# Patient Record
Sex: MALE | Race: WHITE
[De-identification: names, ages, dates, MRNs, and addresses within clinical notes are randomized per-mention and may not be internally consistent; named-entity substitution may affect disease eponyms.]

---

## 2021-10-15 ENCOUNTER — HOSPITAL ENCOUNTER (OUTPATIENT)
Dept: HOSPITAL 47 - RADUSWWP | Age: 71
Discharge: HOME | End: 2021-10-15
Attending: FAMILY MEDICINE
Payer: MEDICARE

## 2021-10-15 DIAGNOSIS — M77.32: Primary | ICD-10-CM

## 2021-10-15 DIAGNOSIS — M12.872: ICD-10-CM

## 2021-10-15 NOTE — XR
EXAMINATION TYPE: XR tibia fibula LT

 

DATE OF EXAM: 10/15/2021

 

COMPARISON: NONE

 

HISTORY: Pain

 

TECHNIQUE:  Two views are submitted.

 

FINDINGS:

The osseous structures are intact. Diffuse osteopenia. Narrowing of the knee joint and hypertrophic c
hanges involving the medial and lateral malleolus. Large calcaneal spurs and calcification along the 
plantar a bone arthrosis..  

 

IMPRESSION: 

1. No acute osseous abnormality.

2. Arthropathy. Large plantar calcaneal spurs.

## 2021-10-15 NOTE — XR
EXAMINATION TYPE: XR toes LT

 

DATE OF EXAM: 10/15/2021

 

COMPARISON: NONE

 

HISTORY: Pain

 

TECHNIQUE: Three views are submitted.

 

FINDINGS:

The osseous structures are intact.    There is no acute fracture or dislocation. Severe hypertrophic 
arthropathy first MTP. Arthropathy of all DIP joints.

 

IMPRESSION:

1. No acute fracture or dislocation.  If symptoms persist, follow-up exam in 7 to 10 days could be ob
tained.  

2. Severe arthropathy.

## 2021-10-15 NOTE — US
EXAMINATION TYPE: US venous doppler duplex LE 

 

DATE OF EXAM: 10/15/2021 11:31 AM

 

COMPARISON: NONE

 

CLINICAL HISTORY: R60.0 LOCALIZED EDEMA. Patient on [employer].

 

SIDE PERFORMED: Left  

 

TECHNIQUE:  The lower extremity deep venous system is examined utilizing real time linear array sonog
cornel with graded compression, doppler sonography and color-flow sonography.

 

VESSELS IMAGED:

Common Femoral Vein

Deep Femoral Vein

Greater Saphenous Vein *

Femoral Vein

Popliteal Vein

Small Saphenous Vein *

Proximal Calf Veins

(* superficial vessels)

 

 

 

 

 

Left Leg:  Negative for DVT

 

Scanning was performed directly over lump left lower leg. There is a complex fluid collection measuri
ng 5.4 x 1.2 x 4.5 cm. 

 

Grayscale, color doppler, spectral doppler imaging performed of the deep veins of the left lower extr
emity.  There is normal flow, compressibility, vascular waveforms.

 

 

IMPRESSION:  No ultrasound evidence for acute DVT in the left lower extremity. Towards end of study t
here is mild to moderate diffuse subcutaneous edema and a moderate size nonsimple fluid collection in
 the lower leg at the palpable lump. Subcutaneous Hematoma suspected in this patient on blood thinner
s. Correlate clinically.

## 2021-10-27 ENCOUNTER — HOSPITAL ENCOUNTER (OUTPATIENT)
Dept: HOSPITAL 47 - OR | Age: 71
Discharge: HOME | End: 2021-10-27
Payer: MEDICARE

## 2021-10-27 VITALS — TEMPERATURE: 97.8 F | RESPIRATION RATE: 16 BRPM

## 2021-10-27 VITALS — HEART RATE: 61 BPM | SYSTOLIC BLOOD PRESSURE: 124 MMHG | DIASTOLIC BLOOD PRESSURE: 74 MMHG

## 2021-10-27 VITALS — BODY MASS INDEX: 34.3 KG/M2

## 2021-10-27 DIAGNOSIS — H35.3112: ICD-10-CM

## 2021-10-27 DIAGNOSIS — H25.12: Primary | ICD-10-CM

## 2021-10-27 DIAGNOSIS — Z79.82: ICD-10-CM

## 2021-10-27 DIAGNOSIS — E78.5: ICD-10-CM

## 2021-10-27 DIAGNOSIS — Z79.84: ICD-10-CM

## 2021-10-27 DIAGNOSIS — H40.1111: ICD-10-CM

## 2021-10-27 DIAGNOSIS — H35.3122: ICD-10-CM

## 2021-10-27 DIAGNOSIS — L71.9: ICD-10-CM

## 2021-10-27 DIAGNOSIS — H40.1122: ICD-10-CM

## 2021-10-27 DIAGNOSIS — Z79.899: ICD-10-CM

## 2021-10-27 DIAGNOSIS — Z86.711: ICD-10-CM

## 2021-10-27 DIAGNOSIS — E11.9: ICD-10-CM

## 2021-10-27 DIAGNOSIS — Z86.718: ICD-10-CM

## 2021-10-27 DIAGNOSIS — I10: ICD-10-CM

## 2021-10-27 DIAGNOSIS — Z79.01: ICD-10-CM

## 2021-10-27 DIAGNOSIS — H35.30: ICD-10-CM

## 2021-10-27 DIAGNOSIS — M54.9: ICD-10-CM

## 2021-10-27 DIAGNOSIS — H40.1131: ICD-10-CM

## 2021-10-27 DIAGNOSIS — Z85.828: ICD-10-CM

## 2021-10-27 PROCEDURE — 66990 OPHTHALMIC ENDOSCOPE ADD-ON: CPT

## 2021-10-27 PROCEDURE — 65820 GONIOTOMY: CPT

## 2021-10-27 PROCEDURE — 66984 XCAPSL CTRC RMVL W/O ECP: CPT

## 2021-10-27 RX ADMIN — MOXIFLOXACIN PRN DROPS: 5 SOLUTION/ DROPS OPHTHALMIC at 12:41

## 2021-10-27 RX ADMIN — CYCLOPENTOLATE HYDROCHLORIDE PRN DROPS: 10 SOLUTION/ DROPS OPHTHALMIC at 10:55

## 2021-10-27 RX ADMIN — PHENYLEPHRINE HYDROCHLORIDE PRN DROPS: 25 SOLUTION/ DROPS OPHTHALMIC at 10:40

## 2021-10-27 RX ADMIN — PHENYLEPHRINE HYDROCHLORIDE PRN DROPS: 25 SOLUTION/ DROPS OPHTHALMIC at 10:50

## 2021-10-27 RX ADMIN — TIMOLOL MALEATE PRN DROPS: 5 SOLUTION OPHTHALMIC at 12:41

## 2021-10-27 RX ADMIN — CYCLOPENTOLATE HYDROCHLORIDE PRN DROPS: 10 SOLUTION/ DROPS OPHTHALMIC at 10:45

## 2021-10-27 RX ADMIN — CYCLOPENTOLATE HYDROCHLORIDE PRN DROPS: 10 SOLUTION/ DROPS OPHTHALMIC at 10:35

## 2021-10-27 RX ADMIN — MOXIFLOXACIN PRN DROPS: 5 SOLUTION/ DROPS OPHTHALMIC at 12:35

## 2021-10-27 RX ADMIN — TIMOLOL MALEATE PRN DROPS: 5 SOLUTION OPHTHALMIC at 12:34

## 2021-10-27 RX ADMIN — PHENYLEPHRINE HYDROCHLORIDE PRN DROPS: 25 SOLUTION/ DROPS OPHTHALMIC at 10:30

## 2021-10-27 NOTE — P.OP
Date of Procedure: 10/27/21


Preoperative Diagnosis: 


NS & POAG mild


Postoperative Diagnosis: 


same


Procedure(s) Performed: 


PIOL & goniotomy OS


Implants: 


GTU826 24.50


Anesthesia: MAC


Surgeon: Werner Davies


Pathology: none sent


Condition: stable


Disposition: same day


Indications for Procedure: 


blurry vision and glaucoma control


Operative Findings: 


no complications

## 2021-10-28 NOTE — OP
OPERATIVE REPORT



DATE OF SURGERY:

10/27/2021.



PROCEDURE:

Phacoemulsification of cataract and intraocular lens implant of the left eye with

goniotomy of the left eye.



PREOPERATIVE DIAGNOSES:

Nuclear sclerosis and open-angle glaucoma, mild stage.



POSTOPERATIVE DIAGNOSIS:

Nuclear sclerosis and open-angle glaucoma, mild stage.



SURGEON:

Dr. Werner Davies.



ANESTHESIA:

Topical.



ESTIMATED BLOOD LOSS:

None.



SPECIMEN TAKEN:

None.



NARRATIVE:

After obtaining the appropriate consent, the patient was brought to the operating room.

There he was asked to sit upright, and the axes of zero and 180 degrees on the

patient's corneal limbus were identified and marked with a gentian violet marker.  He

was then placed in the proper supine position under cardiac monitoring, then prepped

and draped in the usual sterile manner.  He was approached from his left temporal side,

and using previously acquired corneal topography information, a [employer] axis marker

previously inked in gentian violet was placed on the patient's cornea to radha the axis

of 177 degrees.  This was followed by a paracentesis at the 5 o'clock position.  1%

xylocaine MPF 50:50 mix with balanced salt solution was then injected into the anterior

chamber.  This was followed by staining of the anterior structures with Trypan blue,

which was irrigated away after one minute Visco was then used to stabilize the anterior

chamber, and at the 3 o'clock position a 2.5 mm keratome was used to create a self-

sealing corneal flap incision.  The patient was then asked to rotate his head

approximately 45 degrees to his right, and a gonioscopy prism was placed on the

patient's cornea.  Using a [place] Dual Blade goniotomy knife which had been advanced

across the anterior chamber into the nasal portion of the patient's eye using a radha-

and-meet method, approximately 5-1/2 clock-hours of nasal trabecular meshwork was

removed without difficulty.  The patient was then brought to the normal supine position

and a cystotome was introduced to begin a continuous tear capsulorrhexis which was then

otherwise completed using Utrata forceps.  Hydrodissection and hydrodelineation of the

lens was accomplished with balanced salt solution.  Phacoemulsification of the lens

utilizing phaco chop was accomplished in 13.98 seconds at 16% power.  Additional

Xylocaine MPF was instilled into the anterior chamber.  This was followed by removal of

the remaining cortex from in and around the intraocular lens bag as well as some

careful polishing of the posterior capsule in the capsule vacuum mode. Using

additionally Zac and Pepose anterior capsule polishers which were applied to the

anterior capsule, this removed any remaining lens material that could be found. The

capsular bag was then stabilized using Provisc, and a Maco and Maco model YNR608

24.5-diopter spherical equivalent intraocular lens was then introduced into the

capsular bag without difficulty.  The remaining viscoelastic was removed from in and

around the intraocular lens, and using previously marked corneal hashes from the [employer]

marker, the index marks on the patient's lens were then aligned with this 177-degree

axis.  The lens was lightly tamponaded against the posterior capsule to ensure

rotational stability. Following removal of all remaining viscoelastic from the anterior

chamber, the eye was brought to slightly higher than normal intraocular pressure

following the goniotomy procedure which had been previously accomplished.  The

incisions were confirmed watertight.  He then received two drops of 0.5% timolol

followed by two drops of 0.5% moxifloxacin.  He was then lightly patched and shielded

in the usual manner.  There were no complications from the procedure.  He tolerated the

procedure well and was [facility] in good condition.





MMODL / IJN: [id number] / Job#: [id number]

## 2022-02-16 ENCOUNTER — HOSPITAL ENCOUNTER (OUTPATIENT)
Dept: HOSPITAL 47 - OR | Age: 72
Discharge: HOME | End: 2022-02-16
Payer: MEDICARE

## 2022-02-16 VITALS — HEART RATE: 66 BPM | DIASTOLIC BLOOD PRESSURE: 86 MMHG | SYSTOLIC BLOOD PRESSURE: 151 MMHG

## 2022-02-16 VITALS — TEMPERATURE: 98.3 F

## 2022-02-16 VITALS — BODY MASS INDEX: 33.3 KG/M2

## 2022-02-16 VITALS — RESPIRATION RATE: 16 BRPM

## 2022-02-16 DIAGNOSIS — Z79.899: ICD-10-CM

## 2022-02-16 DIAGNOSIS — H52.6: ICD-10-CM

## 2022-02-16 DIAGNOSIS — E11.9: ICD-10-CM

## 2022-02-16 DIAGNOSIS — H25.11: Primary | ICD-10-CM

## 2022-02-16 DIAGNOSIS — H35.3112: ICD-10-CM

## 2022-02-16 DIAGNOSIS — L71.9: ICD-10-CM

## 2022-02-16 DIAGNOSIS — Z82.61: ICD-10-CM

## 2022-02-16 DIAGNOSIS — M54.9: ICD-10-CM

## 2022-02-16 DIAGNOSIS — H35.30: ICD-10-CM

## 2022-02-16 DIAGNOSIS — Z83.511: ICD-10-CM

## 2022-02-16 DIAGNOSIS — Z98.890: ICD-10-CM

## 2022-02-16 DIAGNOSIS — Z82.49: ICD-10-CM

## 2022-02-16 DIAGNOSIS — Z98.42: ICD-10-CM

## 2022-02-16 DIAGNOSIS — H40.1122: ICD-10-CM

## 2022-02-16 DIAGNOSIS — Z96.1: ICD-10-CM

## 2022-02-16 DIAGNOSIS — Z80.0: ICD-10-CM

## 2022-02-16 DIAGNOSIS — Z83.518: ICD-10-CM

## 2022-02-16 DIAGNOSIS — I10: ICD-10-CM

## 2022-02-16 DIAGNOSIS — H40.1131: ICD-10-CM

## 2022-02-16 DIAGNOSIS — Z90.49: ICD-10-CM

## 2022-02-16 DIAGNOSIS — Z83.3: ICD-10-CM

## 2022-02-16 DIAGNOSIS — H52.03: ICD-10-CM

## 2022-02-16 DIAGNOSIS — Z79.82: ICD-10-CM

## 2022-02-16 DIAGNOSIS — H35.3122: ICD-10-CM

## 2022-02-16 DIAGNOSIS — H52.4: ICD-10-CM

## 2022-02-16 DIAGNOSIS — Z79.01: ICD-10-CM

## 2022-02-16 DIAGNOSIS — E78.5: ICD-10-CM

## 2022-02-16 DIAGNOSIS — Z85.828: ICD-10-CM

## 2022-02-16 DIAGNOSIS — Z86.16: ICD-10-CM

## 2022-02-16 LAB — GLUCOSE BLD-MCNC: 91 MG/DL (ref 75–99)

## 2022-02-16 PROCEDURE — 66984 XCAPSL CTRC RMVL W/O ECP: CPT

## 2022-02-16 PROCEDURE — 65820 GONIOTOMY: CPT

## 2022-02-16 RX ADMIN — PHENYLEPHRINE HYDROCHLORIDE PRN DROPS: 25 SOLUTION/ DROPS OPHTHALMIC at 12:43

## 2022-02-16 RX ADMIN — PHENYLEPHRINE HYDROCHLORIDE PRN DROPS: 25 SOLUTION/ DROPS OPHTHALMIC at 12:49

## 2022-02-16 RX ADMIN — MOXIFLOXACIN PRN DROPS: 5 SOLUTION/ DROPS OPHTHALMIC at 14:39

## 2022-02-16 RX ADMIN — CYCLOPENTOLATE HYDROCHLORIDE PRN DROPS: 10 SOLUTION/ DROPS OPHTHALMIC at 12:52

## 2022-02-16 RX ADMIN — TIMOLOL MALEATE PRN DROPS: 5 SOLUTION OPHTHALMIC at 14:28

## 2022-02-16 RX ADMIN — CYCLOPENTOLATE HYDROCHLORIDE PRN DROPS: 10 SOLUTION/ DROPS OPHTHALMIC at 12:40

## 2022-02-16 RX ADMIN — PHENYLEPHRINE HYDROCHLORIDE PRN DROPS: 25 SOLUTION/ DROPS OPHTHALMIC at 12:55

## 2022-02-16 RX ADMIN — MOXIFLOXACIN PRN DROPS: 5 SOLUTION/ DROPS OPHTHALMIC at 14:28

## 2022-02-16 RX ADMIN — CYCLOPENTOLATE HYDROCHLORIDE PRN DROPS: 10 SOLUTION/ DROPS OPHTHALMIC at 12:46

## 2022-02-16 RX ADMIN — TIMOLOL MALEATE PRN DROPS: 5 SOLUTION OPHTHALMIC at 14:39

## 2022-02-16 NOTE — P.OP
Date of Procedure: 02/16/22


Preoperative Diagnosis: 


NS & POAG


Postoperative Diagnosis: 


same


Procedure(s) Performed: 


PIOL< OD


Implants: 


MX60E 24.50


Anesthesia: MAC


Surgeon: Werner Davies


Pathology: none sent


Condition: stable


Disposition: same day


Indications for Procedure: 


blurry vision and better glaucoma control


Operative Findings: 


no complications

## 2022-02-16 NOTE — OP
OPERATIVE REPORT



DATE OF SURGERY:

02/16/2022.



PROCEDURE:

Phacoemulsification of cataract and intraocular lens implant with goniotomy of the

right eye.



PREOPERATIVE DIAGNOSES:

Nuclear sclerosis and primary open-angle glaucoma, mild stage.



POSTOPERATIVE DIAGNOSES:

Nuclear sclerosis and primary open-angle glaucoma, mild stage.



SURGEON:

Dr. Werner Davies.



ANESTHESIA:

Topical.



ESTIMATED BLOOD LOSS:

None.



SPECIMEN TAKEN:

None.



NARRATIVE:

After obtaining the appropriate consent, the patient was brought to the operating room.

There he was placed under cardiac monitoring, prepped and draped in the usual sterile

manner.  He was approached from his right temporal side, and at the 11 o'clock position

an MVR blade was used to create a paracentesis port. Through this opening 1% Xylocaine

and MPF 50:50 mix with balanced salt solution was injected into the anterior chamber.

This was followed by Trypan blue, which was allowed to stay in the eye for

approximately one minute.  Trypan blue was then removed with additional balanced salt

solution and the anterior chamber was stabilized using Viscoat.  At the 9 o'clock

position, a 2.5 mm keratome was used to create a self-sealing corneal flap incision.

The patient was then asked to rotate his head approximately 45 degrees to his left.  A

gonioprism was placed on the patient's cornea, and using a KDB goniotomy knife using an

inside-out method, the trabecular meshwork for approximately 5 clock-hours was removed

from the nasal side of the patient's eye.  The patient was then returned to the normal

supine position.  A cystotome was introduced to begin a continuous tear capsulorrhexis

which was completed using the Utrata forceps.  Hydrodissection and hydrodelineation of

the lens were accomplished with balanced salt solution.  Phacoemulsification of the

lens utilizing phaco chop was accomplished in 11.73 seconds at 12% power. Additional

Xylocaine MPF was instilled into the anterior chamber.  This was followed by removal of

the remaining cortical material under irrigation and aspiration as well as careful

polishing of the posterior capsule in the capsule vacuum mode.  Provisc was then used

to stabilize the capsular bag, and a [employer]ch [employer] Lomb MX60E 24.5 diopter posterior

chamber intraocular lens was then placed into the capsular bag without difficulty.  The

remaining viscoelastic was removed from in and around the intraocular lens with

irrigation and aspiration, and the eye was brought to normal intraocular pressure

through the paracentesis port with balanced salt solution.  The incisions were

confirmed watertight. He then received 2 drops of 0.5% timolol followed by 2 drops of

moxifloxacin.  He was then lightly patched and shielded in the usual manner.  There

were no complications from the procedure.  He tolerated the procedure well and was

returned to [facility] in good condition.





MMABHIJEET / CARLTON: [id number] / Job#: [id number]